# Patient Record
Sex: MALE
[De-identification: names, ages, dates, MRNs, and addresses within clinical notes are randomized per-mention and may not be internally consistent; named-entity substitution may affect disease eponyms.]

---

## 2018-05-18 ENCOUNTER — HOSPITAL ENCOUNTER (OUTPATIENT)
Dept: HOSPITAL 92 - LABBT | Age: 73
Discharge: HOME | End: 2018-05-18
Attending: ORTHOPAEDIC SURGERY
Payer: MEDICARE

## 2018-05-18 DIAGNOSIS — M72.0: ICD-10-CM

## 2018-05-18 DIAGNOSIS — Z01.812: Primary | ICD-10-CM

## 2018-05-18 LAB
BASOPHILS # BLD AUTO: 0 THOU/UL (ref 0–0.2)
BASOPHILS NFR BLD AUTO: 0.5 % (ref 0–1)
EOSINOPHIL # BLD AUTO: 1.1 THOU/UL (ref 0–0.7)
EOSINOPHIL NFR BLD AUTO: 14.2 % (ref 0–10)
HGB BLD-MCNC: 14.4 G/DL (ref 14–18)
LYMPHOCYTES # BLD: 1.3 THOU/UL (ref 1.2–3.4)
LYMPHOCYTES NFR BLD AUTO: 17.6 % (ref 21–51)
MCH RBC QN AUTO: 32.1 PG (ref 27–31)
MCV RBC AUTO: 89.7 FL (ref 80–94)
MONOCYTES # BLD AUTO: 0.5 THOU/UL (ref 0.11–0.59)
MONOCYTES NFR BLD AUTO: 7.1 % (ref 0–10)
NEUTROPHILS # BLD AUTO: 4.5 THOU/UL (ref 1.4–6.5)
NEUTROPHILS NFR BLD AUTO: 60.6 % (ref 42–75)
PLATELET # BLD AUTO: 173 THOU/UL (ref 130–400)
RBC # BLD AUTO: 4.49 MILL/UL (ref 4.7–6.1)
WBC # BLD AUTO: 7.3 THOU/UL (ref 4.8–10.8)

## 2018-05-18 PROCEDURE — 85652 RBC SED RATE AUTOMATED: CPT

## 2018-05-18 PROCEDURE — 85025 COMPLETE CBC W/AUTO DIFF WBC: CPT

## 2018-05-25 ENCOUNTER — HOSPITAL ENCOUNTER (OUTPATIENT)
Dept: HOSPITAL 92 - SDC | Age: 73
Discharge: HOME | End: 2018-05-25
Attending: ORTHOPAEDIC SURGERY
Payer: MEDICARE

## 2018-05-25 VITALS — BODY MASS INDEX: 29 KG/M2

## 2018-05-25 DIAGNOSIS — G56.21: ICD-10-CM

## 2018-05-25 DIAGNOSIS — G56.03: ICD-10-CM

## 2018-05-25 DIAGNOSIS — Z79.899: ICD-10-CM

## 2018-05-25 DIAGNOSIS — E11.9: ICD-10-CM

## 2018-05-25 DIAGNOSIS — Z79.82: ICD-10-CM

## 2018-05-25 DIAGNOSIS — E78.5: ICD-10-CM

## 2018-05-25 DIAGNOSIS — M72.0: Primary | ICD-10-CM

## 2018-05-25 DIAGNOSIS — D17.21: ICD-10-CM

## 2018-05-25 PROCEDURE — 0JBG0ZZ EXCISION OF RIGHT LOWER ARM SUBCUTANEOUS TISSUE AND FASCIA, OPEN APPROACH: ICD-10-PCS | Performed by: ORTHOPAEDIC SURGERY

## 2018-05-25 PROCEDURE — 0JNJ0ZZ RELEASE RIGHT HAND SUBCUTANEOUS TISSUE AND FASCIA, OPEN APPROACH: ICD-10-PCS | Performed by: ORTHOPAEDIC SURGERY

## 2018-05-25 PROCEDURE — 01N40ZZ RELEASE ULNAR NERVE, OPEN APPROACH: ICD-10-PCS | Performed by: ORTHOPAEDIC SURGERY

## 2018-05-25 PROCEDURE — 01N60ZZ RELEASE RADIAL NERVE, OPEN APPROACH: ICD-10-PCS | Performed by: ORTHOPAEDIC SURGERY

## 2018-05-25 PROCEDURE — 01N50ZZ RELEASE MEDIAN NERVE, OPEN APPROACH: ICD-10-PCS | Performed by: ORTHOPAEDIC SURGERY

## 2018-05-25 PROCEDURE — 88304 TISSUE EXAM BY PATHOLOGIST: CPT

## 2018-05-25 PROCEDURE — S0020 INJECTION, BUPIVICAINE HYDRO: HCPCS

## 2018-05-25 NOTE — OP
DATE OF PROCEDURE:  05/25/2018

 

PREOPERATIVE DIAGNOSES:  

1.  Right carpal tunnel syndrome.  

2.  Right Dupuytren's cord to both the ring finger and the long finger out to the proximal phalangeal
 joint large and long.  

3.  Two digital nerve compression all 3 sites as listed above.

4.  Mass, ulnar side of the distal forearm.  

 

POSTOPERATIVE DIAGNOSES:  

1.  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  

2.  15 cm long x 8 cm thick Dupuytren's cord with fingers to both the PIP joints level of the flexor 
tendons to the ring finger and small and long finger with digital nerve involvement. 

3.  Tight transverse carpal ligament with minimal flattening of the median nerve.

 

PROCEDURE PERFORMED:

1.  Dupuytrens subtotal palmar fasciectomy.

2.  Radial and ulnar digital nerve neuroplasty long finger.

3.  Radial digital nerve neuroplasty ring finger, right. 

4.  Carpal tunnel release.

5.  Excisional biopsy, lipoma, distal ulnar forearm, right.

 

SURGEON:  Demetrius Harrison M.D.

 

ANESTHESIA:  General LMA technique augmented by 30 mL 0.5% Marcaine block by [employer].

 

INDICATIONS:  Mass and problems listed above diagnosed clinically and failed conservative treatment.

 

DESCRIPTION OF PROCEDURE:  After successful general LMA technique, the limb was prepped and draped.  
Timeout was done appropriately.  The patient then had the limb exsanguinated, tourniquet inflated to 
250 mmHg pressure.  Then gave a total of 30 mL of Marcaine 0.5% divided equally between the 3 incisio
ns.  We then made a zigzag incision approaching the two digits, the radial one-half of the proximal p
halanx of the ring finger and the entire proximal half of the long finger into the palm just distal t
o the transverse carpal ligament.  We carried this through skin and subcutaneous tissue.  Then, we id
entified proximally as possible neurovascular bundle and began a digital neuroplasty out 2.5 mm dista
l to both limbs of this very thick cord.  Once this was done, we  the cord from the skin at 
all levels, and filed its insertion on the flexor tendon sheath just proximal to the proximal phalang
eal joint on the ring finger and the long finger just distal to the proximal phalangeal joint.

 

Then, with the nerve protected we had dissected it back proximally, released from the transcarpal lig
ament lifted it from the patient and sent as a specimen.  The digital nerves were intact.  Celestone 
was given 2 mL in this area.

 

We then released the transverse mid incision beginning 6 mm proximal to this up to the level of the w
rist flexion crease in line with the ring finger and carried it through skin, subcutaneous tissue to 
identify the transcarpal ligament.  We then used a self-retaining retractor to release the transcarpa
l ligament from its mid portion distally using a combination of [place] blade and tenotomy scissors an
d direct visualization.  The same [place] blade, tenotomy, direct visualization combination was used t
o release it from the mid portion proximally.

 

From here, we placed the arm in slight elbow flexion, identified the mass and carried the incision th
rough skin and subcutaneous tissue the distal aspect of the ulnar forearm.  Here we find a lipoma.  W
e used electrocautery to separate the venous plexus around it, protected all destruction and lifted t
he lipoma from the patient.

 

Tourniquet was released at 80 minutes.  Hemostasis obtained.  All of the wounds listed above were fernando
sed with interrupted 4-0 nylon in an interrupted mattress pattern.  Bulky dressing was applied and th
e patient left the operating room without evidence of anesthetic or operative complication.

## 2019-09-19 ENCOUNTER — HOSPITAL ENCOUNTER (OUTPATIENT)
Dept: HOSPITAL 92 - TBSIIMAG | Age: 74
Discharge: HOME | End: 2019-09-19
Attending: UROLOGY
Payer: MEDICARE

## 2019-09-19 DIAGNOSIS — R97.20: Primary | ICD-10-CM

## 2019-09-19 LAB — ESTIMATED GFR-MDRD - POC: 73

## 2019-09-19 PROCEDURE — 72197 MRI PELVIS W/O & W/DYE: CPT

## 2019-09-19 PROCEDURE — 82565 ASSAY OF CREATININE: CPT

## 2019-09-19 NOTE — MRI
MRI Pelvis W WO Con



History: R 97.20 elevated PSA



Comparison: None.



Findings: Multiplanar multisequence MRI pelvis performed prior to and after the intravenous ministrat
ion of contrast. The exam was reviewed on an 3-D independent workstation.



The prostate measures 5.1 x 3.8 x 3.8 cm for a volume of 36.1 mL.



Peripheral zone: Normal.



Transitional zone: Extending from the base to the mid gland from 12:00-1:00 abutting the fibromuscula
r stroma is a 2 x 1.7 x 1.5 cm mass which is markedly T2 hypointense with diffusion restriction.



Neurovascular bundles: Intact



Seminal vesicles: Intact



Bones: Heterogeneous although on the large field of view T1 weighted imaging sequence there are is no
 evidence of marrow signal replacement to suggest osseous metastatic disease.



Intrapelvic soft tissues: Unremarkable



Lymph nodes: Unremarkable. No evidence for metastasis. Subtle asymmetric left obturator lymph node me
asures 5 mm short axis.



Impression: 

1. PI-RADS 5: High (clinically significant prostate cancer is highly likely to be present). This occu
rs in the transitional zone 12:00-1:00 from the base to the mid gland extending into the anterior

fiber muscular stroma.

2. Intact prostatic capsule and neurovascular bundles. 

3. No evidence for osseous or local regional lymphatic metastatic disease.

4. Heterogeneous bone marrow signal is likely reflective of red marrow hyperplasia and less likely me
tastasis. Nuclear medicine bone scan recommended.



Reported By: Daniel Singletary 

Electronically Signed:  9/19/2019 1:20 PM

## 2019-12-03 ENCOUNTER — HOSPITAL ENCOUNTER (OUTPATIENT)
Dept: HOSPITAL 92 - LABBT | Age: 74
Discharge: HOME | End: 2019-12-03
Attending: UROLOGY
Payer: COMMERCIAL

## 2019-12-03 DIAGNOSIS — R35.0: ICD-10-CM

## 2019-12-03 DIAGNOSIS — E11.9: ICD-10-CM

## 2019-12-03 DIAGNOSIS — N40.0: ICD-10-CM

## 2019-12-03 DIAGNOSIS — R97.20: ICD-10-CM

## 2019-12-03 DIAGNOSIS — N47.1: ICD-10-CM

## 2019-12-03 DIAGNOSIS — Z01.818: Primary | ICD-10-CM

## 2019-12-03 LAB
ANION GAP SERPL CALC-SCNC: 12 MMOL/L (ref 10–20)
APTT PPP: 30.1 SEC (ref 22.9–36.1)
BUN SERPL-MCNC: 24 MG/DL (ref 8.4–25.7)
CALCIUM SERPL-MCNC: 9.4 MG/DL (ref 7.8–10.44)
CHLORIDE SERPL-SCNC: 107 MMOL/L (ref 98–107)
CO2 SERPL-SCNC: 23 MMOL/L (ref 23–31)
CREAT CL PREDICTED SERPL C-G-VRATE: 0 ML/MIN (ref 70–130)
GLUCOSE SERPL-MCNC: 221 MG/DL (ref 83–110)
GLUCOSE UR STRIP-MCNC: 100 MG/DL
HGB BLD-MCNC: 12.8 G/DL (ref 14–18)
INR PPP: 0.9
MCH RBC QN AUTO: 31.9 PG (ref 27–31)
MCV RBC AUTO: 90.6 FL (ref 78–98)
PLATELET # BLD AUTO: 177 THOU/UL (ref 130–400)
POTASSIUM SERPL-SCNC: 4.7 MMOL/L (ref 3.5–5.1)
PROT UR STRIP.AUTO-MCNC: 30 MG/DL
PROTHROMBIN TIME: 12.5 SEC (ref 12–14.7)
RBC # BLD AUTO: 4.02 MILL/UL (ref 4.7–6.1)
RBC UR QL AUTO: (no result) HPF (ref 0–3)
SODIUM SERPL-SCNC: 137 MMOL/L (ref 136–145)
WBC # BLD AUTO: 9.4 THOU/UL (ref 4.8–10.8)
WBC UR QL AUTO: (no result) HPF (ref 0–3)

## 2019-12-03 PROCEDURE — 93010 ELECTROCARDIOGRAM REPORT: CPT

## 2019-12-03 PROCEDURE — 85027 COMPLETE CBC AUTOMATED: CPT

## 2019-12-03 PROCEDURE — 81001 URINALYSIS AUTO W/SCOPE: CPT

## 2019-12-03 PROCEDURE — 87086 URINE CULTURE/COLONY COUNT: CPT

## 2019-12-03 PROCEDURE — 85610 PROTHROMBIN TIME: CPT

## 2019-12-03 PROCEDURE — 93005 ELECTROCARDIOGRAM TRACING: CPT

## 2019-12-03 PROCEDURE — 80048 BASIC METABOLIC PNL TOTAL CA: CPT

## 2019-12-03 PROCEDURE — 85730 THROMBOPLASTIN TIME PARTIAL: CPT

## 2019-12-08 ENCOUNTER — HOSPITAL ENCOUNTER (EMERGENCY)
Dept: HOSPITAL 18 - NAV ERS | Age: 74
Discharge: HOME | End: 2019-12-08
Payer: COMMERCIAL

## 2019-12-08 DIAGNOSIS — Z79.84: ICD-10-CM

## 2019-12-08 DIAGNOSIS — J02.9: Primary | ICD-10-CM

## 2019-12-08 DIAGNOSIS — E78.00: ICD-10-CM

## 2019-12-08 DIAGNOSIS — Z79.899: ICD-10-CM

## 2019-12-08 DIAGNOSIS — E11.9: ICD-10-CM

## 2019-12-08 PROCEDURE — 70360 X-RAY EXAM OF NECK: CPT

## 2019-12-08 PROCEDURE — 96372 THER/PROPH/DIAG INJ SC/IM: CPT

## 2019-12-08 NOTE — RAD
Soft tissues 2 views



HISTORY: Throat pain.



FINDINGS: Epiglottis has normal appearance. No upper airway constriction evident. No radiopaque forei
gn bodies visible. There are degenerative changes of the cervical spine.











IMPRESSION: No acute abnormalities are demonstrated.



Reported By: ARTUR Madison 

Electronically Signed:  12/8/2019 11:02 PM

## 2019-12-11 ENCOUNTER — HOSPITAL ENCOUNTER (OUTPATIENT)
Dept: HOSPITAL 92 - SDC | Age: 74
Discharge: HOME | End: 2019-12-11
Attending: UROLOGY
Payer: COMMERCIAL

## 2019-12-11 VITALS — BODY MASS INDEX: 31.9 KG/M2

## 2019-12-11 DIAGNOSIS — N40.1: Primary | ICD-10-CM

## 2019-12-11 DIAGNOSIS — Z79.82: ICD-10-CM

## 2019-12-11 DIAGNOSIS — N47.1: ICD-10-CM

## 2019-12-11 DIAGNOSIS — E78.5: ICD-10-CM

## 2019-12-11 DIAGNOSIS — Z79.84: ICD-10-CM

## 2019-12-11 DIAGNOSIS — Z79.899: ICD-10-CM

## 2019-12-11 DIAGNOSIS — R35.0: ICD-10-CM

## 2019-12-11 DIAGNOSIS — E11.9: ICD-10-CM

## 2019-12-11 PROCEDURE — 0VB03ZX EXCISION OF PROSTATE, PERCUTANEOUS APPROACH, DIAGNOSTIC: ICD-10-PCS | Performed by: UROLOGY

## 2019-12-11 PROCEDURE — 88305 TISSUE EXAM BY PATHOLOGIST: CPT

## 2019-12-11 NOTE — OP
DATE OF PROCEDURE:  12/11/2019



PREOPERATIVE DIAGNOSES:  

1. A  74-year-old male with unremarkable PALOMO, PSA of 4.3 to 3.7.

2. Abnormal MRI demonstrating PI-RADS-5 lesion.



POSTOPERATIVE DIAGNOSES:  

1. A  74-year-old male with unremarkable PALOMO, PSA of 4.3 to 3.7.

2. Abnormal MRI demonstrating PI-RADS-5 lesion.



PROCEDURES PERFORMED:  Transrectal ultrasound 12-core needle prostate biopsy, 
MRI

fusion biopsy. 



ANESTHESIA:  TIVA.



COMPLICATIONS:  None apparent.



DISPOSITION:  To recovery room in stable condition.



SPECIMEN:  A 12-core standard needle core prostate biopsy, four cores obtained 
from

region of interest. 



FINDINGS:  Prostate volume 38 g.



INDICATIONS FOR PROCEDURE AND HISTORY:  Mr. Rhodes is a  74-year-old male, who

presented to my office for evaluation of elevated PSA with unremarkable PALOMO.  
His

initial presenting PSA of 4.4 to 4.3 with unremarkable PALOMO.  We did obtain an 
MRI,

given his age and comorbidities.  We discussed options of MRI.  If MRI 
reassuring,

options of observation versus proceeding with biopsy.  MRI was obtained

demonstrating a PI-RADS-5 lesion.  Therefore, the patient and family desired to

proceed with prostate biopsy, MRI fusion biopsy.  Risks and complications of the

procedure including bleeding, pain, infection, urosepsis, chronic pain, injury 
to

adjacent organs, was reviewed with him in detail and all questions answered to 
their

satisfaction.  They desired to proceed. 



DESCRIPTION OF PROCEDURE:  After an informed consent was signed, the patient was

taken to the operating room, placed in a supine position.  TIVA anesthesia was

administered and the patient was placed in a lateral decubitus position with

pressure points padded and protected.  A standard transrectal ultrasound probe 
was

placed and we measured the prostate volume:  Urethral length of 4.1, width of 
5.1,

height of 3.4, volume estimated to be 38 g.  We then subsequently transitioned 
him

to perform an MRI fusion biopsy.  We calibrated him and aligned his prostate 
with an

ultrasound imaging.  The region of interest was easily found, four cores from 
the

region of interest was obtained.  Then, we subsequently performed a 12-core 
prostate

biopsy in a standard pattern.  He did have some oozing from the rectum, I did

observe him, which demonstrated it did slow down with observation.  A digital 
rectal

exam was repeated and there was no significant clots per se.  He was 
hemodynamically

stable.  He was transitioned to the recovery room in stable condition.  We will

observe him postprocedure for significant bleed.  If stable, we will discharge.
  I

have recommend ciprofloxacin 500 mg one p.o. b.i.d. for 5 days.  Appointment 
with me

next Tuesday to review pathology. 







Job ID:  [id number]



MTDD

## 2022-10-21 ENCOUNTER — HOSPITAL ENCOUNTER (OUTPATIENT)
Dept: HOSPITAL 92 - CSHSDC | Age: 77
Discharge: HOME | End: 2022-10-21
Attending: INTERNAL MEDICINE
Payer: MEDICARE

## 2022-10-21 VITALS — BODY MASS INDEX: 35.2 KG/M2

## 2022-10-21 DIAGNOSIS — Z12.11: Primary | ICD-10-CM

## 2022-10-21 DIAGNOSIS — E78.5: ICD-10-CM

## 2022-10-21 DIAGNOSIS — N18.9: ICD-10-CM

## 2022-10-21 DIAGNOSIS — E11.319: ICD-10-CM

## 2022-10-21 DIAGNOSIS — K64.9: ICD-10-CM

## 2022-10-21 DIAGNOSIS — I12.9: ICD-10-CM

## 2022-10-21 DIAGNOSIS — E11.22: ICD-10-CM

## 2022-10-21 DIAGNOSIS — Z86.73: ICD-10-CM

## 2022-10-21 DIAGNOSIS — Z98.890: ICD-10-CM

## 2022-10-21 DIAGNOSIS — K57.30: ICD-10-CM

## 2022-10-21 DIAGNOSIS — E66.9: ICD-10-CM

## 2022-10-21 DIAGNOSIS — K76.9: ICD-10-CM

## 2022-10-21 DIAGNOSIS — K20.90: ICD-10-CM

## 2022-10-21 DIAGNOSIS — D64.9: ICD-10-CM

## 2022-10-21 PROCEDURE — 0DJD8ZZ INSPECTION OF LOWER INTESTINAL TRACT, VIA NATURAL OR ARTIFICIAL OPENING ENDOSCOPIC: ICD-10-PCS | Performed by: INTERNAL MEDICINE

## 2022-10-21 PROCEDURE — 0DJ08ZZ INSPECTION OF UPPER INTESTINAL TRACT, VIA NATURAL OR ARTIFICIAL OPENING ENDOSCOPIC: ICD-10-PCS | Performed by: INTERNAL MEDICINE

## 2022-10-21 PROCEDURE — 43235 EGD DIAGNOSTIC BRUSH WASH: CPT

## 2023-01-02 NOTE — EKG
Test Reason : 

Blood Pressure : ***/*** mmHG

Vent. Rate : 073 BPM     Atrial Rate : 073 BPM

   P-R Int : 154 ms          QRS Dur : 096 ms

    QT Int : 394 ms       P-R-T Axes : [phone] degrees

   QTc Int : 434 ms

 

Normal sinus rhythm

Incomplete right bundle branch block

Possible Right ventricular hypertrophy

Abnormal ECG

When compared with ECG of 13-FEB-2017 12:31,

Incomplete right bundle branch block is now Present

Confirmed by KADIE YOUNGBLOOD ([id number]) on 12/5/2019 9:24:56 PM

 

Referred By:  UMER           Confirmed By:KADIE YOUNGBLOOD
room air